# Patient Record
(demographics unavailable — no encounter records)

---

## 2024-12-16 NOTE — HISTORY OF PRESENT ILLNESS
[de-identified] : The patient is a 14 year old male who presents today for evaluation of a concussion. here with mom. DOI: 12/11/24 Number of lifetime concussions (including current): this would be his first. ATC: Jose Roberto jovel, SATIND[employer] Sports: wrestling  At time of injury: wrestling and hit his head on the mat hard.  LOC: no Memory Loss: no Seizure: Initial Symptoms: dizziness, nausea, light sensitivity.  Attends:  [employer]- Freshman Grade: 9th  Allergies:  Has IEP- learning disorder NOS. gets extra help- ICP, Has IEP No adhd no migraines  Seen by Jose Roberto day of- seen by PCP on 12th.  stayed home tooday for this appointment.  Of note- Also sees Dr. Sin in [place], Has had a hx of some eye tracking issues. No prisms- low grade glasses given- he never really wore them and Dr. Sin was ok with that  Review of Systems: Constitutional:  no fever, no recent weight loss HEENT: see HPI CV: negative Pulm: negative GI: negative : negative Neuro: see HPI Skin: negative Endocrine: negative Heme: negative MSK: See HPI.

## 2024-12-16 NOTE — DISCUSSION/SUMMARY
[de-identified] : The family and I talked at length about concussion and the management of the concussion as well as the prospects for returning to sport in the future. 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  I would recommend avoiding ball sports in general (on the field and in gym class) as there is a higher chance of re-injury with these. I would also remember to stay at a comfortable level and not push through any pain; biking can be better tolerated than running early on. Sleep: Sleep hygiene discussed Cognitive-School current recommended level:  Full school with academic accommodations as needed.  A letter has been provided to you today, listing these accommodations for your teachers. Vision and Vestibular:No rehabilitation therapies needed at this time - but may need to start after next visit. Headache: Take breaks, avoid extra stimuli and work other accommodations as discussed in the office today.  Follow up in 1 week to reassess clinical exam and make continued treatment plan recommendations.   The family has a clear understanding of the plan.  All questions were answered during the visit. If any questions arise before the next visit the family is encouraged to call me. Yolis Das, DO, FAAP, CAQ-SM Pediatric and Adolescent Sports Medicine [facility]

## 2024-12-23 NOTE — IMAGING
[de-identified] : PHYSICAL EXAM: Constitutional: Well developed, Well nourished, No acute distress Psych: Appropriate appearance, affect, rate of speech. Eye contact readily made Eyes: EOMI, PERRLA, Normal conjunctiva Head, Ears, Nose, Mouth, Throat: Normal cephalic with no tender areas or signs of trauma. Normal appearing nose/nares. Normal oral mucosa, palate, and pharynx Respiratory:Normal effort, no respiratory distress, no cyanosis Cardiovascular: intact distal pulses, visible extremities are warm and well perfused Abdominal/GI: Not Examined Neurological:  CN 2-12 and DTRs patella are normal Sensation upper and lower extremity: Normal Coordination: Normal finger to nose testing with dominant hand- (LEFT)- slightly slowed and misses target on occasion- but better from initial visit. Skin: Skin over exposed areas of both upper and lower extremities intact cerebellar testing- palm up and down- normal   Left eye- sits a little superior to right but EOMI no ana ptosis Cervical Spine ROM Flexion: Normal ROM Extension:Normal ROM Right Lateral Flexion: Normal ROM Left Lateral Flexion: Normal ROM Right Rotation: Normal ROM Left Rotation: Normal   Vestibular/Ocular Smooth pursuits: 0 beats of nystagmus with tracking Can track fast moving object   Reports No symptoms and has no physical signs with 5  repetitions of smooth pursuits   Saccades: Horizontal: Reports No symptom(s)but MILDLY struggles with fatigue and consistent tempo with repetitions  of 20 Vertical:  Reports no symptom(s) but still moderately struggles with fatigue and tempo and slows with repetitions of 10   VOR/Gaze stability: Horizontal VOR:  mild increase in headache and has No physical signs with 5 repetitions of horizontal  VOR/gaze stability: Vertical VOR: mild incr in headache and has No physical signs with 5  repetitions of vertical   VOR/gaze stability Visual motion sensitivity (seated):  deferred   Binocular convergence: Convergence blurry at cm of  11 Break (double) at cm of 7 Binocular recovery: Double to single at cm of 9 Blur to clear at cm of 10 Reports  NO symptom(s) and has no physical signs with convergence   Monocular (accommodation): Right clear to blur at cm of  12 Left clear to blur at cm of 11 Reports NO symptom(s) and has no physical signs with accommodation   Balance:deferred

## 2024-12-23 NOTE — HISTORY OF PRESENT ILLNESS
[de-identified] : 12/23/24 pt is here for follow up on his concussion. States his symptoms have improved. Last headache was on 12/19/24- but school was basically nothing the end of last week presently off for [place] break. a bit light sensitive. still a little noise sensitive. watching practice.  The patient is a 14 year old male who presents today for evaluation of a concussion. here with mom. DOI: 12/11/24 Number of lifetime concussions (including current): this would be his first. ATC: Jose Roberto jovel, [employer] Sports: wrestling  At time of injury: wrestling and hit his head on the mat hard.  LOC: no Memory Loss: no Seizure: Initial Symptoms: dizziness, nausea, light sensitivity.  Attends:  [employer]- Freshman Grade: 9th  Allergies:  Has IEP- learning disorder NOS. gets extra help- ICP, Has IEP No ADHD  no migraines  Seen by Jose Roberto day of- seen by PCP on 12th.  stayed home tooday for this appointment.  Of note- Also sees Dr. Sin in [place], Has had a hx of some eye tracking issues. No prisms- low grade glasses given- he never really wore them and Dr. Sin was ok with that  Review of Systems: Constitutional:  no fever, no recent weight loss HEENT: see HPI CV: negative Pulm: negative GI: negative : negative Neuro: see HPI Skin: negative Endocrine: negative Heme: negative MSK: See HPI.

## 2024-12-23 NOTE — DISCUSSION/SUMMARY
[de-identified] : The family and I talked at length about concussion and the management of the concussion as well as the prospects for returning to sport in the future. concussion vestibular dysfunction saccadic dysfunction Here are the current recommendations by category: Autonomic continue to exercise lightly on own start vestibular pt continue academic accommodations follow up on 1/6/25 The family has a clear understanding of the plan.  All questions were answered during the visit. If any questions arise before the next visit the family is encouraged to call me. Yolis Das DO, FAAP, CAQ-[facility] Pediatric and Adolescent Sports Medicine [facility]

## 2025-01-06 NOTE — IMAGING
[de-identified] : PHYSICAL EXAM: Constitutional: Well developed, Well nourished, No acute distress Psych: Appropriate appearance, affect, rate of speech. Eye contact readily made Eyes: EOMI, PERRLA, Normal conjunctiva Head, Ears, Nose, Mouth, Throat: Normal cephalic with no tender areas or signs of trauma. Normal appearing nose/nares. Normal oral mucosa, palate, and pharynx Respiratory:Normal effort, no respiratory distress, no cyanosis Cardiovascular: intact distal pulses, visible extremities are warm and well perfused Abdominal/GI: Not Examined Neurological:  CN 2-12 and DTRs patella were previously tested and normal- no redone Sensation upper and lower extremity: Normal Coordination: Normal finger to nose testing with dominant hand- normal Skin: Skin over exposed areas of both upper and lower extremities intact cerebellar testing- palm up and down- normal   Left eye- sits a little superior to right but EOMI no ana ptosis Cervical Spine ROM Flexion: Normal ROM Extension:Normal ROM Right Lateral Flexion: Normal ROM Left Lateral Flexion: Normal ROM Right Rotation: Normal ROM Left Rotation: Normal   Vestibular/Ocular Smooth pursuits: 0 beats of nystagmus with tracking Can track fast moving object   Reports No symptoms and has no physical signs with 5  repetitions of smooth pursuits   Saccades: Horizontal: Reports No symptom(s)but MILDLY struggles with fatigue and consistent tempo with repetitions  of 20 Vertical:  Reports no symptom(s) but still moderately struggles with fatigue and tempo and slows with repetitions of 15   VOR/Gaze stability: Horizontal VOR:  no symptoms and has No physical signs with 5 repetitions of horizontal  VOR/gaze stability: Vertical VOR: no symptoms and has No physical signs with 5  repetitions of vertical   VOR/gaze stability Visual motion sensitivity (seated):  deferred   Binocular convergence: Convergence blurry at cm of  9 Break (double) at cm of 5 Binocular recovery: Double to single at cm of 7 Blur to clear at cm of 8 Reports  NO symptom(s) and has no physical signs with convergence   Monocular (accommodation): Right clear to blur at cm of  8.5 Left clear to blur at cm of 8.5 Reports NO symptom(s) and has no physical signs with accommodation   Balance: no ana truncal instability eye open backwards and forward. Mildly off balance with eyes closed forward and back- one missteps with going backwards. no symptoms otherwise

## 2025-01-06 NOTE — HISTORY OF PRESENT ILLNESS
[de-identified] : 1/6/25 pt is here for a follow up of his concussion today. States he is not having any symptoms. was able to start PT for vestibular services as that doesnt start until 1/9 due to next available. did well in school today- no issues mom agrees- seems to be back to his baseline.  12/23/24 pt is here for follow up on his concussion. States his symptoms have improved. Last headache was on 12/19/24- but school was basically nothing the end of last week presently off for [place] break. a bit light sensitive. still a little noise sensitive. watching practice.  The patient is a 14 year old male who presents today for evaluation of a concussion. here with mom. DOI: 12/11/24 Number of lifetime concussions (including current): this would be his first. ATC: Jose Roberto jovel ATC Sports: wrestling  At time of injury: wrestling and hit his head on the mat hard.  LOC: no Memory Loss: no Seizure: Initial Symptoms: dizziness, nausea, light sensitivity.  Attends:  [employer]- Freshman Grade: 9th  Allergies:  Has IEP- learning disorder NOS. gets extra help- ICP, Has IEP No ADHD  no migraines  Seen by Jose Roberto day of- seen by PCP on 12th.  stayed home tooday for this appointment.  Of note- Also sees Dr. Sin in [place], Has had a hx of some eye tracking issues. No prisms- low grade glasses given- he never really wore them and Dr. Sin was ok with that  Review of Systems: Constitutional:  no fever, no recent weight loss HEENT: see HPI CV: negative Pulm: negative GI: negative : negative Neuro: see HPI Skin: negative Endocrine: negative Heme: negative MSK: See HPI.

## 2025-01-06 NOTE — HISTORY OF PRESENT ILLNESS
[de-identified] : 1/6/25 pt is here for a follow up of his concussion today. States he is not having any symptoms. was able to start PT for vestibular services as that doesnt start until 1/9 due to next available. did well in school today- no issues mom agrees- seems to be back to his baseline.  12/23/24 pt is here for follow up on his concussion. States his symptoms have improved. Last headache was on 12/19/24- but school was basically nothing the end of last week presently off for [place] break. a bit light sensitive. still a little noise sensitive. watching practice.  The patient is a 14 year old male who presents today for evaluation of a concussion. here with mom. DOI: 12/11/24 Number of lifetime concussions (including current): this would be his first. ATC: Jose Roberto jovel ATC Sports: wrestling  At time of injury: wrestling and hit his head on the mat hard.  LOC: no Memory Loss: no Seizure: Initial Symptoms: dizziness, nausea, light sensitivity.  Attends:  [employer]- Freshman Grade: 9th  Allergies:  Has IEP- learning disorder NOS. gets extra help- ICP, Has IEP No ADHD  no migraines  Seen by Jose Roberto day of- seen by PCP on 12th.  stayed home tooday for this appointment.  Of note- Also sees Dr. Sin in [place], Has had a hx of some eye tracking issues. No prisms- low grade glasses given- he never really wore them and Dr. Sin was ok with that  Review of Systems: Constitutional:  no fever, no recent weight loss HEENT: see HPI CV: negative Pulm: negative GI: negative : negative Neuro: see HPI Skin: negative Endocrine: negative Heme: negative MSK: See HPI.

## 2025-01-06 NOTE — DISCUSSION/SUMMARY
[de-identified] : The family and I talked at length about concussion and the management of the concussion as well as the prospects for returning to sport in the future. concussion vestibular dysfunction saccadic dysfunction-likely longer standing Probably back to baseline as he is completely symptom free. Here are the current recommendations by category: I think he needs to seek out the vestibular therapy and follow up with his eye specialist Dr. Sin as there are still deficiets to his eye tracking. Luckily it is not provoking any symptoms and he feels normal thus I suspect this is more of a baseline deficiency so it does not have to hold him back from moving forward with RTP but spent time discussing this at length with mom and patient and highly recommended attending to this further. The family has a clear understanding of the plan.  All questions were answered during the visit. If any questions arise before the next visit the family is encouraged to call me. Yolis Das, DO, FAAP, CAQ-[facility] Pediatric and Adolescent Sports Medicine [facility]  I have spent 31 minutes of time on the encounter which excludes teaching and separately reported services.

## 2025-01-06 NOTE — IMAGING
[de-identified] : PHYSICAL EXAM: Constitutional: Well developed, Well nourished, No acute distress Psych: Appropriate appearance, affect, rate of speech. Eye contact readily made Eyes: EOMI, PERRLA, Normal conjunctiva Head, Ears, Nose, Mouth, Throat: Normal cephalic with no tender areas or signs of trauma. Normal appearing nose/nares. Normal oral mucosa, palate, and pharynx Respiratory:Normal effort, no respiratory distress, no cyanosis Cardiovascular: intact distal pulses, visible extremities are warm and well perfused Abdominal/GI: Not Examined Neurological:  CN 2-12 and DTRs patella were previously tested and normal- no redone Sensation upper and lower extremity: Normal Coordination: Normal finger to nose testing with dominant hand- normal Skin: Skin over exposed areas of both upper and lower extremities intact cerebellar testing- palm up and down- normal   Left eye- sits a little superior to right but EOMI no ana ptosis Cervical Spine ROM Flexion: Normal ROM Extension:Normal ROM Right Lateral Flexion: Normal ROM Left Lateral Flexion: Normal ROM Right Rotation: Normal ROM Left Rotation: Normal   Vestibular/Ocular Smooth pursuits: 0 beats of nystagmus with tracking Can track fast moving object   Reports No symptoms and has no physical signs with 5  repetitions of smooth pursuits   Saccades: Horizontal: Reports No symptom(s)but MILDLY struggles with fatigue and consistent tempo with repetitions  of 20 Vertical:  Reports no symptom(s) but still moderately struggles with fatigue and tempo and slows with repetitions of 15   VOR/Gaze stability: Horizontal VOR:  no symptoms and has No physical signs with 5 repetitions of horizontal  VOR/gaze stability: Vertical VOR: no symptoms and has No physical signs with 5  repetitions of vertical   VOR/gaze stability Visual motion sensitivity (seated):  deferred   Binocular convergence: Convergence blurry at cm of  9 Break (double) at cm of 5 Binocular recovery: Double to single at cm of 7 Blur to clear at cm of 8 Reports  NO symptom(s) and has no physical signs with convergence   Monocular (accommodation): Right clear to blur at cm of  8.5 Left clear to blur at cm of 8.5 Reports NO symptom(s) and has no physical signs with accommodation   Balance: no ana truncal instability eye open backwards and forward. Mildly off balance with eyes closed forward and back- one missteps with going backwards. no symptoms otherwise

## 2025-01-06 NOTE — DISCUSSION/SUMMARY
[de-identified] : The family and I talked at length about concussion and the management of the concussion as well as the prospects for returning to sport in the future. concussion vestibular dysfunction saccadic dysfunction-likely longer standing Probably back to baseline as he is completely symptom free. Here are the current recommendations by category: I think he needs to seek out the vestibular therapy and follow up with his eye specialist Dr. Sin as there are still deficiets to his eye tracking. Luckily it is not provoking any symptoms and he feels normal thus I suspect this is more of a baseline deficiency so it does not have to hold him back from moving forward with RTP but spent time discussing this at length with mom and patient and highly recommended attending to this further. The family has a clear understanding of the plan.  All questions were answered during the visit. If any questions arise before the next visit the family is encouraged to call me. Yolis Das, DO, FAAP, CAQ-[facility] Pediatric and Adolescent Sports Medicine [facility]  I have spent 31 minutes of time on the encounter which excludes teaching and separately reported services.